# Patient Record
Sex: MALE | Race: WHITE | Employment: OTHER | URBAN - METROPOLITAN AREA
[De-identification: names, ages, dates, MRNs, and addresses within clinical notes are randomized per-mention and may not be internally consistent; named-entity substitution may affect disease eponyms.]

---

## 2020-02-03 ENCOUNTER — THERAPY VISIT (OUTPATIENT)
Dept: PHYSICAL THERAPY | Facility: CLINIC | Age: 70
End: 2020-02-03
Payer: COMMERCIAL

## 2020-02-03 DIAGNOSIS — M54.2 NECK PAIN: ICD-10-CM

## 2020-02-03 DIAGNOSIS — M54.12 CERVICAL RADICULOPATHY: ICD-10-CM

## 2020-02-03 PROCEDURE — 97110 THERAPEUTIC EXERCISES: CPT | Mod: GP | Performed by: PHYSICAL THERAPIST

## 2020-02-03 PROCEDURE — 97161 PT EVAL LOW COMPLEX 20 MIN: CPT | Mod: GP | Performed by: PHYSICAL THERAPIST

## 2020-02-03 NOTE — PROGRESS NOTES
Physical Therapy Initial Evaluation   Cervical Spine  Precautions/Restrictions/MD instructions: Dr. Garret Anand - E&T 6 visits   Therapist Impression:   Pt is a 70 y/o male, with chronic history of cervical radiculopathy 3-5 months s/p unknown. Pt presents with s/s consistent with cervical radiculopathy. These impairments limit their ability to participate in daily activities and sleep comfortably. Skilled PT services necessary in order to reduce impairments and improve independent function.    Subjective:   Chief Complaint: Cervical Spine - Radiculopathy/Ulnar nerve distribution   YONNY: Percussion in orchestra   DOI/onset: 2 months  Location: L C Spine - C7-C8/R Shoulder Quality: dull, weakness into ring and pinky finger  Frequency: intermittent Radiates: Yes  Pain scale: 3+/10  Time of day: Morning worse than night Sleeping: Every 45 minutes - 1 contoured pillow   Exacerbated by: Sleeping, Lying on R>L side,  Relieved by: Blue stop, massage, Progression: Worsening  Previous Treatment: Massage Effect of prior treatment: effective   PMH and/or surgical history: Heart problems   Imaging: None    Occupation: [employer] Job duties: computer work, driving, 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)    Current HEP/Exercise regimen: [employer] 3x/week (swimming laps/Running) Patient's goals: Reduce weakness, reduce pain and fear avoidance  Medications: Prednisone   General health as reported by patient: Excellent  Return to MD: PRN  Red Flags: Night pain, (B) symptoms    CERVICAL:    Posture: rounded shoulders, stiff movement patterns (guarded)    Neurological:    Motor Deficit:  Myotomes L R   C4 (shoulder elevation) - -   C5 (shoulder abduction) - -   C6 (elbow flexion) - -   C7 (elbow extension) - -   C8 (thumb extension) - -   T1 (finger add/abd) - -       AROM: (Major, Moderate, Minimal or Nil loss)  Movement Loss Scott Mod Min Nil Pain   Protrusion    X    Flexion   X     Retraction   X     Extension   X      Left Rotation  X   Increased tightness along L scapula   Right Rotation  X      Left Side Bending  X   Tightness along L scapula   Right Side bending  X          Repeated movement testing:   (During: produces, abolishes, increases, decreases, no effect, centralizing, peripheralizing; After: better, worse, no better, no worse, no effect, centralized, peripheralized)    Pre-test Symptoms Sitting: Tightness L of C7-8 along scapular border    Symptoms During Symptoms After ROM increased ROM decreased No Effect   RET No effect No effect   X   Rep RET No effect No effect X     FLEX No effect No effect   X   Rep FLEX Increased No worse   X       Shoulder Screen: Flexion, IR, Base ABD p! Lateral shoulder    Other:     T-spine- Poor thoracic spine rotation and minimal extension    Assessment/Plan:    Patient is a 69 year old male with cervical complaints.    Patient has the following significant findings with corresponding treatment plan.                Diagnosis 1:  Cervical Radiculopathy  Pain -  hot/cold therapy, US, electric stimulation, mechanical traction, manual therapy, STS, splint/taping/bracing/orthotics, self management, education, directional preference exercise and home program  Decreased ROM/flexibility - manual therapy, therapeutic exercise, therapeutic activity and home program  Decreased strength - therapeutic exercise, therapeutic activities and home program  Impaired muscle performance - neuro re-education and home program  Decreased function - therapeutic activities, home program and functional performance testing  Impaired posture - neuro re-education, therapeutic activities and home program    Therapy Evaluation Codes:   1) History comprised of:   Personal factors that impact the plan of care:      None.    Comorbidity factors that impact the plan of care are:      Heart problems.     Medications impacting care: Anti-inflammatory.  2) Examination of Body Systems comprised of:   Body structures and  functions that impact the plan of care:      Cervical spine.   Activity limitations that impact the plan of care are:      Bending, Cooking, Dressing, Grasping and Sleeping.  3) Clinical presentation characteristics are:   Stable/Uncomplicated.  4) Decision-Making    Low complexity using standardized patient assessment instrument and/or measureable assessment of functional outcome.  Cumulative Therapy Evaluation is: Low complexity.    Previous and current functional limitations:  (See Goal Flow Sheet for this information)    Short term and Long term goals: (See Goal Flow Sheet for this information)     Communication ability:  Patient appears to be able to clearly communicate and understand verbal and written communication and follow directions correctly.  Treatment Explanation - The following has been discussed with the patient:   RX ordered/plan of care  Anticipated outcomes  Possible risks and side effects  This patient would benefit from PT intervention to resume normal activities.   Rehab potential is excellent.    Frequency:  1 X week, once daily  Duration:  for 6 weeks  Discharge Plan:  Achieve all LTG.  Independent in home treatment program.  Reach maximal therapeutic benefit.    Please refer to the daily flowsheet for treatment today, total treatment time and time spent performing 1:1 timed codes.

## 2020-02-10 ENCOUNTER — THERAPY VISIT (OUTPATIENT)
Dept: PHYSICAL THERAPY | Facility: CLINIC | Age: 70
End: 2020-02-10
Payer: COMMERCIAL

## 2020-02-10 DIAGNOSIS — M54.12 CERVICAL RADICULOPATHY: ICD-10-CM

## 2020-02-10 DIAGNOSIS — M54.2 NECK PAIN: ICD-10-CM

## 2020-02-10 PROCEDURE — 97110 THERAPEUTIC EXERCISES: CPT | Mod: GP | Performed by: PHYSICAL THERAPIST

## 2020-02-10 PROCEDURE — 97530 THERAPEUTIC ACTIVITIES: CPT | Mod: GP | Performed by: PHYSICAL THERAPIST

## 2020-02-17 ENCOUNTER — THERAPY VISIT (OUTPATIENT)
Dept: PHYSICAL THERAPY | Facility: CLINIC | Age: 70
End: 2020-02-17
Payer: COMMERCIAL

## 2020-02-17 DIAGNOSIS — M54.12 CERVICAL RADICULOPATHY: ICD-10-CM

## 2020-02-17 DIAGNOSIS — M54.2 NECK PAIN: ICD-10-CM

## 2020-02-17 PROCEDURE — 97110 THERAPEUTIC EXERCISES: CPT | Mod: GP | Performed by: PHYSICAL THERAPIST

## 2020-02-17 PROCEDURE — 97530 THERAPEUTIC ACTIVITIES: CPT | Mod: GP | Performed by: PHYSICAL THERAPIST

## 2020-02-17 PROCEDURE — 97112 NEUROMUSCULAR REEDUCATION: CPT | Mod: GP | Performed by: PHYSICAL THERAPIST

## 2020-02-24 ENCOUNTER — THERAPY VISIT (OUTPATIENT)
Dept: PHYSICAL THERAPY | Facility: CLINIC | Age: 70
End: 2020-02-24
Payer: COMMERCIAL

## 2020-02-24 DIAGNOSIS — M54.2 NECK PAIN: ICD-10-CM

## 2020-02-24 DIAGNOSIS — M54.12 CERVICAL RADICULOPATHY: ICD-10-CM

## 2020-02-24 PROCEDURE — 97112 NEUROMUSCULAR REEDUCATION: CPT | Mod: GP | Performed by: PHYSICAL THERAPIST

## 2020-02-24 PROCEDURE — 97110 THERAPEUTIC EXERCISES: CPT | Mod: GP | Performed by: PHYSICAL THERAPIST

## 2020-02-24 NOTE — PROGRESS NOTES
DISCHARGE REPORT    Progress reporting period is from 2/3/2020 to 2/24/2020.       SUBJECTIVE  Subjective: (P) Has felt about the same. Played every night over the past week - playing [employer]. Exercises have been going well. Plans to meet with drum .     Current pain level is 0/10  .     Initial Pain level: 3/10.   Changes in function:  Yes (See Goal flowsheet attached for changes in current functional level)  Adverse reaction to treatment or activity: None    OBJECTIVE  Changes noted in objective findings:  Yes, Full cervical ROM w/o symptoms. Ability to play for 2 hours without symptoms.    ASSESSMENT/PLAN  Updated problem list and treatment plan: Diagnosis 1:  Cervical Radiculopathy  Impaired muscle performance - home program  Decreased function - home program  STG/LTGs have been met or progress has been made towards goals:  Yes (See Goal flow sheet completed today.)  Assessment of Progress: The patient has met all of their long term goals.  Self Management Plans:  Patient is independent in a home treatment program.  Patient is independent in self management of symptoms.  I have re-evaluated this patient and find that the nature, scope, duration and intensity of the therapy is appropriate for the medical condition of the patient.  Prosper continues to require the following intervention to meet STG and LTG's:  PT intervention is no longer required to meet STG/LTG.    Recommendations:  This patient is ready to be discharged from therapy and continue their home treatment program.    Please refer to the daily flowsheet for treatment today, total treatment time and time spent performing 1:1 timed codes.

## 2020-07-22 ENCOUNTER — THERAPY VISIT (OUTPATIENT)
Dept: PHYSICAL THERAPY | Facility: CLINIC | Age: 70
End: 2020-07-22
Payer: COMMERCIAL

## 2020-07-22 DIAGNOSIS — M25.511 CHRONIC RIGHT SHOULDER PAIN: ICD-10-CM

## 2020-07-22 DIAGNOSIS — G89.29 CHRONIC RIGHT SHOULDER PAIN: ICD-10-CM

## 2020-07-22 PROCEDURE — 97110 THERAPEUTIC EXERCISES: CPT | Mod: GP | Performed by: PHYSICAL THERAPIST

## 2020-07-22 PROCEDURE — 97161 PT EVAL LOW COMPLEX 20 MIN: CPT | Mod: GP | Performed by: PHYSICAL THERAPIST

## 2020-07-22 NOTE — PROGRESS NOTES
[facility] Initial Evaluation  Subjective:  The history is provided by the patient. No  was used.   Therapist Generated HPI Evaluation  Problem details: A while back ago was on a boogy board and fell off and got sucked in the undertoe and was trying to pull himself out and when he did his shoulder was hurting. Neck problems more recently from playing in [employer] shows and just dealing with pain static sitting. Did PT for his neck and has been better but his shoulder still aches and throbs. Recently got injection about 2 weeks ago which did help. .         Type of problem:  Right shoulder.    This is a chronic (6/22/20) condition.  Condition occurred with:  Unknown cause (unknown recent injury had the history of boogy board accident 3 years ago).    Patient reports pain:  Anterior and lateral.    Pain radiates to:  Upper arm.     Associated symptoms:  Loss of strength. Exacerbated by: Pulling the car door open in IR like movement, Overhead movements, throwing ball to grandson, drumming, turning to the right in bed.   Relieved by: 19 years ago did theraband exercises but has not been doing that, 3-4 weeks would swim and walk run program.          Patient Health History  Prosper Lowery being seen for right shoulder pain.     Problem began: 6/22/2020.   Problem occurred: history of shoulder pain but nothing known recently.    Pain is reported as 7/10 on pain scale.  General health as reported by patient is excellent.  Pertinent medical history includes: heart problems (arrythmia and rapid heart rate).   Red flags:  None as reported by patient.  Medical allergies: penecillin.   Surgeries include:  None.        Current occupation is retired.      Other job/home tasks details:  / [employer].                                  Objective:  System                   Shoulder Evaluation:  ROM:  AROM:    Flexion:  Left:  153    Right:  150  Extension: Left: 70Right: 70  Abduction:  Left:  160   Right:  163      External Rotation:  Left:  88    Right:  88            Extension/Internal Rotation:  Left:  T3    Right:  T10          Strength:    Flexion: Left:5/5 Strong/pain free    Pain:    Right: 4/5  Weak/pain free     Pain:   Extension:  Left: 5/5  Strong/pain free    Pain:    Right: 5/5    Strong/pain free  Pain:  Abduction:  Left: 5/5  Strong/pain free  Pain:    Right: 5/5   Strong/pain free    Pain:  Adduction:  Left: 5/5   Strong/pain free   Pain:    Right: 5/5   Strong/pain free    Pain:  Internal Rotation:  Left:5 and 3+/5   Strong/pain free and weak/painful    Pain:    Right: 3+/5   Weak/painful    Pain:  External Rotation:   Left:5/5   Strong/pain free    Pain:   Right:5/5   Strong/pain free    Pain:    Horizontal Abduction:  Left:5/5   Strong/pain free    Pain:    Right:5/5   Strong/pain free   Pain:  Horizontal Adduction:  Left:5/5   Strong/pain free    Pain:    Right:4/5   Weak/painful    Pain:  Elbow Flexion:  Left:5/5   Strong/pain free    Pain:    Right:4/5   Weak/painful    Pain:  Elbow Extension:  Left:5/5   Strong/pain free    Pain:    Right:5/5   Strong/pain free    Pain:  Stability Testing:  normal      Special Tests:      Right shoulder positive for the following special tests:Labral  Right shoulder negative for the following special tests:Impingement; Neural Tension; Rotator cuff tear and Acrimioclavicular  Palpation:  normal                                         General     ROS    Assessment/Plan:    Patient is a 70 year old male with right side shoulder complaints.    Patient has the following significant findings with corresponding treatment plan.                Diagnosis 1:  Right shoulder pain   Pain -  hot/cold therapy, US, manual therapy, self management, education, directional preference exercise and home program  Decreased ROM/flexibility - manual therapy, therapeutic exercise, therapeutic activity and home program  Decreased strength - therapeutic exercise, therapeutic  activities and home program  Decreased function - therapeutic activities and home program  Instability -  Therapeutic Activity  Therapeutic Exercise  Neuromuscular Re-education  home program    Therapy Evaluation Codes:   1) History comprised of:   Personal factors that impact the plan of care:      Time since onset of symptoms.    Comorbidity factors that impact the plan of care are:      None.     Medications impacting care: None.  2) Examination of Body Systems comprised of:   Body structures and functions that impact the plan of care:      Shoulder.   Activity limitations that impact the plan of care are:      Lifting.  3) Clinical presentation characteristics are:   Stable/Uncomplicated.  4) Decision-Making    Low complexity using standardized patient assessment instrument and/or measureable assessment of functional outcome.  Cumulative Therapy Evaluation is: Low complexity.    Previous and current functional limitations:  (See Goal Flow Sheet for this information)    Short term and Long term goals: (See Goal Flow Sheet for this information)     Communication ability:  Patient appears to be able to clearly communicate and understand verbal and written communication and follow directions correctly.  Treatment Explanation - The following has been discussed with the patient:   RX ordered/plan of care  Anticipated outcomes  Possible risks and side effects  This patient would benefit from PT intervention to resume normal activities.   Rehab potential is excellent.    Frequency:  1 X week, once daily  Duration:  for 6 weeks  Discharge Plan:  Achieve all LTG.  Independent in home treatment program.  Reach maximal therapeutic benefit.    Please refer to the daily flowsheet for treatment today, total treatment time and time spent performing 1:1 timed codes.

## 2020-07-22 NOTE — LETTER
[facility] PHYSICAL THERAPY  [address]  [facility] [address]  [phone]    2020    Re: Prosper Lowery   :   1950  MRN:  [id number]   REFERRING PHYSICIAN:   Marisol Durán    [facility] PHYSICAL THERAPY    Date of Initial Evaluation: 2020  Visits:  Rxs Used: 1  Reason for Referral:  Chronic right shoulder pain    EVALUATION SUMMARY    [facility] Initial Evaluation  Subjective:  The history is provided by the patient. No  was used.   Therapist Generated HPI Evaluation  Problem details: A while back ago was on a [employer] board and fell off and got sucked in the undertoe and was trying to pull himself out and when he did his shoulder was hurting. Neck problems more recently from playing in [employer] shows and just dealing with pain static sitting. Did PT for his neck and has been better but his shoulder still aches and throbs. Recently got injection about 2 weeks ago which did help. .         Type of problem:  Right shoulder.  This is a chronic (20) condition.  Condition occurred with:  Unknown cause (unknown recent injury had the history of [employer] board accident 3 years ago).  Patient reports pain:  Anterior and lateral.  Pain radiates to:  Upper arm.     Associated symptoms:  Loss of strength. Exacerbated by: Pulling the car door open in IR like movement, Overhead movements, throwing ball to grandson, drumming, turning to the right in bed.   Relieved by: 19 years ago did theraband exercises but has not been doing that, 3-4 weeks would swim and walk run program.    Patient Health History  Prosper Lowery being seen for right shoulder pain.   Problem began: 2020.   Problem occurred: history of shoulder pain but nothing known recently.    Pain is reported as 7/10 on pain scale.  General health as reported by patient is excellent.  Pertinent medical history includes: heart problems (arrythmia and rapid  heart rate).   Red flags:  None as reported by patient.  Medical allergies: penecillin.   Surgeries include:  None.    Current occupation is retired.   Other job/home tasks details:  / [employer].                  Re: Prosper Lowery   :   1950    Objective:  System  Shoulder Evaluation:  ROM:  AROM:    Flexion:  Left:  153    Right:  150  Extension: Left: 70Right: 70  Abduction:  Left: 160   Right:  163  External Rotation:  Left:  88    Right:  88  Extension/Internal Rotation:  Left:  T3    Right:  T10    Strength:    Flexion: Left:5/5 Strong/pain free    Pain:    Right: 4/5  Weak/pain free     Pain:   Extension:  Left: 5/5  Strong/pain free    Pain:    Right: 5/5    Strong/pain free  Pain:  Abduction:  Left: 5/5  Strong/pain free  Pain:    Right: 5/5   Strong/pain free    Pain:  Adduction:  Left: 5/5   Strong/pain free   Pain:    Right: 5/5   Strong/pain free    Pain:  Internal Rotation:  Left:5 and 3+/5   Strong/pain free and weak/painful    Pain:    Right: 3+/5   Weak/painful    Pain:  External Rotation:   Left:5/5   Strong/pain free    Pain:   Right:5/5   Strong/pain free    Pain:    Horizontal Abduction:  Left:5/5   Strong/pain free    Pain:    Right:5/5   Strong/pain free   Pain:  Horizontal Adduction:  Left:5/5   Strong/pain free    Pain:    Right:4/5   Weak/painful    Pain:  Elbow Flexion:  Left:5/5   Strong/pain free    Pain:    Right:4/5   Weak/painful    Pain:  Elbow Extension:  Left:5/5   Strong/pain free    Pain:    Right:5/5   Strong/pain free    Pain:  Stability Testing:  normal  Special Tests:    Right shoulder positive for the following special tests:Labral  Right shoulder negative for the following special tests:Impingement; Neural Tension; Rotator cuff tear and Acrimioclavicular  Palpation:  normal    Assessment/Plan:    Patient is a 70 year old male with right side shoulder complaints.    Patient has the following significant findings with corresponding treatment plan.                 Diagnosis 1:  Right shoulder pain   Pain -  hot/cold therapy, US, manual therapy, self management, education, directional preference exercise and home program  Decreased ROM/flexibility - manual therapy, therapeutic exercise, therapeutic activity and home program  Decreased strength - therapeutic exercise, therapeutic activities and home program  Decreased function - therapeutic activities and home program  Instability -  Therapeutic Activity  Therapeutic Exercise  Neuromuscular Re-education  home program    Therapy Evaluation Codes:   1) History comprised of:   Personal factors that impact the plan of care:      Time since onset of symptoms.   Re: Prosper Lowery   :   1950     Comorbidity factors that impact the plan of care are:      None.     Medications impacting care: None.  2) Examination of Body Systems comprised of:   Body structures and functions that impact the plan of care:      Shoulder.   Activity limitations that impact the plan of care are:      Lifting.  3) Clinical presentation characteristics are:   Stable/Uncomplicated.  4) Decision-Making    Low complexity using standardized patient assessment instrument and/or measureable assessment of functional outcome.  Cumulative Therapy Evaluation is: Low complexity.    Previous and current functional limitations:  (See Goal Flow Sheet for this information)    Short term and Long term goals: (See Goal Flow Sheet for this information)     Communication ability:  Patient appears to be able to clearly communicate and understand verbal and written communication and follow directions correctly.  Treatment Explanation - The following has been discussed with the patient:   RX ordered/plan of care  Anticipated outcomes  Possible risks and side effects  This patient would benefit from PT intervention to resume normal activities.   Rehab potential is excellent.    Frequency:  1 X week, once daily  Duration:  for 6 weeks  Discharge Plan:  Achieve all  LTG.  Independent in home treatment program.  Reach maximal therapeutic benefit.    Thank you for your referral.    INQUIRIES  Therapist: Christian Hartmann DPT  [facility] PHYSICAL THERAPY  [address]  [facility] [address]  Phone: [phone]  Fax: [phone]

## 2020-07-29 ENCOUNTER — THERAPY VISIT (OUTPATIENT)
Dept: PHYSICAL THERAPY | Facility: CLINIC | Age: 70
End: 2020-07-29
Payer: COMMERCIAL

## 2020-07-29 DIAGNOSIS — M25.511 CHRONIC RIGHT SHOULDER PAIN: ICD-10-CM

## 2020-07-29 DIAGNOSIS — G89.29 CHRONIC RIGHT SHOULDER PAIN: ICD-10-CM

## 2020-07-29 PROCEDURE — 97110 THERAPEUTIC EXERCISES: CPT | Mod: GP | Performed by: PHYSICAL THERAPIST

## 2020-08-05 ENCOUNTER — THERAPY VISIT (OUTPATIENT)
Dept: PHYSICAL THERAPY | Facility: CLINIC | Age: 70
End: 2020-08-05
Payer: COMMERCIAL

## 2020-08-05 DIAGNOSIS — G89.29 CHRONIC RIGHT SHOULDER PAIN: ICD-10-CM

## 2020-08-05 DIAGNOSIS — M25.511 CHRONIC RIGHT SHOULDER PAIN: ICD-10-CM

## 2020-08-05 PROCEDURE — 97110 THERAPEUTIC EXERCISES: CPT | Mod: GP | Performed by: PHYSICAL THERAPIST

## 2020-08-18 ENCOUNTER — THERAPY VISIT (OUTPATIENT)
Dept: PHYSICAL THERAPY | Facility: CLINIC | Age: 70
End: 2020-08-18
Payer: COMMERCIAL

## 2020-08-18 DIAGNOSIS — M25.511 CHRONIC RIGHT SHOULDER PAIN: ICD-10-CM

## 2020-08-18 DIAGNOSIS — G89.29 CHRONIC RIGHT SHOULDER PAIN: ICD-10-CM

## 2020-08-18 PROCEDURE — 97110 THERAPEUTIC EXERCISES: CPT | Mod: GP | Performed by: PHYSICAL THERAPIST

## 2020-08-28 ENCOUNTER — TELEPHONE (OUTPATIENT)
Dept: PHYSICAL THERAPY | Facility: CLINIC | Age: 70
End: 2020-08-28

## 2020-08-28 DIAGNOSIS — G89.29 CHRONIC RIGHT SHOULDER PAIN: ICD-10-CM

## 2020-08-28 DIAGNOSIS — M25.511 CHRONIC RIGHT SHOULDER PAIN: ICD-10-CM

## 2020-08-28 NOTE — TELEPHONE ENCOUNTER
Spoke with Prosper about his MRI results as he wanted to know if PT golden anything should change based on the results. Instructed Prosper it was okay to continue with his HEP as he had been seeming to improve and seemed like most of his pain was in bicep area and MRI did show some fraying at the bicep tendon but nothing that would limit his current HEP. Pt worked with a orthopaedic doctor about his MRI and was not 100% comfortable with their conversation as it seemed rushed. I educated pt in [facility] to surgery im am not the expert and if he was not satisfied with his conversation I recommend he could speak with Dr. Covarrubias at [facility] about his shoulder for a second opinion as he is still dealing with pain in the arm that prevents him from sleeping some nights. Pt was given number for scheduling and was okay with plan.

## 2020-09-02 ENCOUNTER — THERAPY VISIT (OUTPATIENT)
Dept: PHYSICAL THERAPY | Facility: CLINIC | Age: 70
End: 2020-09-02
Payer: COMMERCIAL

## 2020-09-02 DIAGNOSIS — M25.511 CHRONIC RIGHT SHOULDER PAIN: ICD-10-CM

## 2020-09-02 DIAGNOSIS — G89.29 CHRONIC RIGHT SHOULDER PAIN: ICD-10-CM

## 2020-09-02 PROCEDURE — 97110 THERAPEUTIC EXERCISES: CPT | Mod: GP | Performed by: PHYSICAL THERAPIST

## 2020-09-02 PROCEDURE — 97140 MANUAL THERAPY 1/> REGIONS: CPT | Mod: GP | Performed by: PHYSICAL THERAPIST

## 2020-10-21 PROBLEM — M25.511 CHRONIC RIGHT SHOULDER PAIN: Status: RESOLVED | Noted: 2020-07-22 | Resolved: 2020-10-21

## 2020-10-21 PROBLEM — G89.29 CHRONIC RIGHT SHOULDER PAIN: Status: RESOLVED | Noted: 2020-07-22 | Resolved: 2020-10-21

## 2020-10-21 NOTE — PROGRESS NOTES
Discharge Note    Progress reporting period is from initial evaluation date (please see noted date below) to Sep 2, 2020.  Linked Episodes   Type: Episode: Status: Noted: Resolved: Last update: Updated by:   PHYSICAL THERAPY right shoulder pain 7/22/20 Active 7/22/2020 9/2/2020  9:10 AM Jerome Hartmann, PT      Comments:       Prosper failed to follow up and current status is unknown.  Please see information below for last relevant information on current status.  Patient seen for 5 visits.    SUBJECTIVE  Subjective changes noted by patient:  Patient reports he got a second opinion from Dr. Suarez and was told the same in [facility] to continuing with non-surgical treatment. [place] like he got more education on the shoulder. The tightening of the arm is still mainly the bicep tendon. Pain still comes and goes.  .  Current pain level is 1/10.     Previous pain level was  5/10.   Changes in function:  Yes (See Goal flowsheet attached for changes in current functional level)  Adverse reaction to treatment or activity: None    OBJECTIVE  Changes noted in objective findings: 160 deg with slight pain flexion, 162 abd with slight ERP, ER 82 deg, ext/IR Left T4, Right T9, extension left 72 deg, right 62 deg flex 4/5, abd 4/5, ER 5/5, IR 5/5,      ASSESSMENT/PLAN  Diagnosis: right shoulder pain   Updated problem list and treatment plan:   Pain - HEP  Decreased strength - HEP  STG/LTGs have been met or progress has been made towards goals:  Yes, please see goal flowsheet for most current information  Assessment of Progress: current status is unknown.    Last current status: Pt is progressing slower than anticipated   Self Management Plans:  HEP  I have re-evaluated this patient and find that the nature, scope, duration and intensity of the therapy is appropriate for the medical condition of the patient.  Prosper continues to require the following intervention to meet STG and LTG's:  HEP.    Recommendations:  Discharge with current  home program.  Patient to follow up with MD as needed.    Please refer to the daily flowsheet for treatment today, total treatment time and time spent performing 1:1 timed codes.